# Patient Record
(demographics unavailable — no encounter records)

---

## 2025-05-17 NOTE — PHYSICAL EXAM
[Well Developed] : well developed [Well Nourished] : well nourished [No Acute Distress] : no acute distress [Normal Conjunctiva] : normal conjunctiva [Normal Venous Pressure] : normal venous pressure [No Carotid Bruit] : no carotid bruit [Normal S1, S2] : normal S1, S2 [No Murmur] : no murmur [No Rub] : no rub [No Gallop] : no gallop [Clear Lung Fields] : clear lung fields [Good Air Entry] : good air entry [No Respiratory Distress] : no respiratory distress  [Soft] : abdomen soft [Non Tender] : non-tender [No Masses/organomegaly] : no masses/organomegaly [Normal Bowel Sounds] : normal bowel sounds [Normal Gait] : normal gait [No Edema] : no edema [No Cyanosis] : no cyanosis [No Clubbing] : no clubbing [No Varicosities] : no varicosities [No Rash] : no rash [No Skin Lesions] : no skin lesions [Moves all extremities] : moves all extremities [No Focal Deficits] : no focal deficits [Normal Speech] : normal speech [Alert and Oriented] : alert and oriented [Normal memory] : normal memory [de-identified] :  III/VI systolic murmur that increases in intensity with Valsalva maneuver

## 2025-05-17 NOTE — DISCUSSION/SUMMARY
[EKG obtained to assist in diagnosis and management of assessed problem(s)] : EKG obtained to assist in diagnosis and management of assessed problem(s) [FreeTextEntry1] :  1. HOCM: MRI had one in 2019 at [facility].  It showed findings consistent with HCM. The basal anteroseptal and anterior segments demonstrated maximal wall thickness of the basal anteroseptum. There was LGE of the basal anterior and anteroseptal segments.  There was also NARCISA. Given that phenotype for HCM can progress over time, will repeat MRI. Will try to obtain a quantitative assessment of degree of LGE. Reviewed lab from 2019. Other than a slightly elevated ESR, there were no significant abnormalities. Recent echocardiogram consistent with HOCM with gradient of 30 mm. Will have patient see Mariusz Frank (number given).   2. Vertigo: In discussing recent fall and hospitalization, does not sound like patient passed out.  Incident was more consistent with fall due to vertigo which has since resolved. Patient's symptoms all resolved. Review of monitor showed episodes of sinus tachycardia.

## 2025-05-17 NOTE — DISCUSSION/SUMMARY
[EKG obtained to assist in diagnosis and management of assessed problem(s)] : EKG obtained to assist in diagnosis and management of assessed problem(s) [FreeTextEntry1] :  1. HOCM: MRI had one in 2019 at [facility].  It showed findings consistent with HCM. The basal anteroseptal and anterior segments demonstrated maximal wall thickness of the basal anteroseptum. There was LGE of the basal anterior and anteroseptal segments.  There was also NARCISA. Given that phenotype for HCM can progress over time, will repeat MRI. Will try to obtain a quantitative assessment of degree of LGE. Reviewed lab from 2019. Other than a slightly elevated ESR, there were no significant abnormalities. Recent echocardiogram consistent with HOCM with gradient of 30 mm. Will have patient see Mariusz Frank (number given).   2. Vertigo: In discussing recent fall and hospitalization, does not sound like patient passed out.  Incident was more consistent with fall due to vertigo which has since resolved. Patient's symptoms all resolved. Review of monitor showed episodes of sinus tachycardia.

## 2025-05-17 NOTE — CARDIOLOGY SUMMARY
[de-identified] : 4/11/2025: CONCLUSIONS:   1. Left ventricular systolic function is hyperdynamic with an ejection fraction visually estimated at >75 %.  2. Normal left ventricular diastolic function.  3. Normal right ventricular cavity size, with normal wall thickness, and normal right ventricular systolic function.  4. Normal left and right atrial size.  5. No pericardial effusion seen.  6. Estimated pulmonary artery systolic pressure is 23 mmHg.  7. Hypertrophic obstructive cardiomyopathy (HOCM).  8. Mild left ventricular hypertrophy.  9. Systolic anterior motion of the mitral valve with an increased left ventricular outflow tract gradient of 30 mmHg indicative of mild left ventricular outflow tract obstruction. 10. The inferior vena cava is normal in size measuring 1.62 cm in diameter, (normal <2.1cm) with normal inspiratory collapse (normal >50%) consistent with normal right atrial pressure (\R\3, range 0-5mmHg).

## 2025-05-17 NOTE — HISTORY OF PRESENT ILLNESS
[FreeTextEntry1] : Carmela Goddard is a 48-year-old woman [facility] states that she was having fatigue and SOB with exercise. She reported she had been exercising well with no problems until recently. With Dr. Pantoja lab work was done. ECHO was done by Dr. Carrillo Mcpherson and was determined unremarkable.  April 11, 2025, was not feeling well with symptoms of vertigo, not sleeping well, palpitations, and fell down a flight of stairs went to ER.  Received arrhythmia monitoring Zio patch, no significant arrhythmias except sinus tachycardia. All symptoms have passed and patient back to normal. Patient has not had any recent chest pain, dyspnea, palpitations, syncope or fatigue.

## 2025-05-17 NOTE — HISTORY OF PRESENT ILLNESS
[FreeTextEntry1] : Carmela Goddard is a 48-year-old woman [facility] states that she was having fatigue and SOB with exercise. She reported she had been exercising well with no problems until recently. With Dr. Pantoja lab work was done. ECHO was done by Dr. Carrillo Mcpherson and was determined unremarkable.  April 11, 2025, was not feeling well with symptoms of vertigo, not sleeping well, palpitations, and fell down a flight of stairs went to ER.  Received arrhythmia monitoring Zio patch, no significant arrhythmias except sinus tachycardia. All symptoms have passed and patient back to normal. Patient has not had any recent chest pain, dyspnea, palpitations, syncope or fatigue.

## 2025-05-17 NOTE — CARDIOLOGY SUMMARY
[de-identified] : 4/11/2025: CONCLUSIONS:   1. Left ventricular systolic function is hyperdynamic with an ejection fraction visually estimated at >75 %.  2. Normal left ventricular diastolic function.  3. Normal right ventricular cavity size, with normal wall thickness, and normal right ventricular systolic function.  4. Normal left and right atrial size.  5. No pericardial effusion seen.  6. Estimated pulmonary artery systolic pressure is 23 mmHg.  7. Hypertrophic obstructive cardiomyopathy (HOCM).  8. Mild left ventricular hypertrophy.  9. Systolic anterior motion of the mitral valve with an increased left ventricular outflow tract gradient of 30 mmHg indicative of mild left ventricular outflow tract obstruction. 10. The inferior vena cava is normal in size measuring 1.62 cm in diameter, (normal <2.1cm) with normal inspiratory collapse (normal >50%) consistent with normal right atrial pressure (\R\3, range 0-5mmHg). 71

## 2025-05-17 NOTE — PHYSICAL EXAM
[Well Developed] : well developed [Well Nourished] : well nourished [No Acute Distress] : no acute distress [Normal Conjunctiva] : normal conjunctiva [Normal Venous Pressure] : normal venous pressure [No Carotid Bruit] : no carotid bruit [Normal S1, S2] : normal S1, S2 [No Murmur] : no murmur [No Rub] : no rub [No Gallop] : no gallop [Clear Lung Fields] : clear lung fields [Good Air Entry] : good air entry [No Respiratory Distress] : no respiratory distress  [Soft] : abdomen soft [Non Tender] : non-tender [No Masses/organomegaly] : no masses/organomegaly [Normal Bowel Sounds] : normal bowel sounds [Normal Gait] : normal gait [No Edema] : no edema [No Cyanosis] : no cyanosis [No Clubbing] : no clubbing [No Varicosities] : no varicosities [No Rash] : no rash [No Skin Lesions] : no skin lesions [Moves all extremities] : moves all extremities [No Focal Deficits] : no focal deficits [Normal Speech] : normal speech [Alert and Oriented] : alert and oriented [Normal memory] : normal memory [de-identified] :  III/VI systolic murmur that increases in intensity with Valsalva maneuver